# Patient Record
Sex: FEMALE | Race: ASIAN | NOT HISPANIC OR LATINO
[De-identification: names, ages, dates, MRNs, and addresses within clinical notes are randomized per-mention and may not be internally consistent; named-entity substitution may affect disease eponyms.]

---

## 2022-10-12 PROBLEM — Z00.00 ENCOUNTER FOR PREVENTIVE HEALTH EXAMINATION: Status: ACTIVE | Noted: 2022-10-12

## 2022-10-17 ENCOUNTER — APPOINTMENT (OUTPATIENT)
Dept: RHEUMATOLOGY | Facility: CLINIC | Age: 47
End: 2022-10-17

## 2022-10-17 VITALS
BODY MASS INDEX: 31.72 KG/M2 | OXYGEN SATURATION: 98 % | HEART RATE: 78 BPM | TEMPERATURE: 98 F | WEIGHT: 168 LBS | HEIGHT: 61 IN | DIASTOLIC BLOOD PRESSURE: 71 MMHG | SYSTOLIC BLOOD PRESSURE: 111 MMHG | RESPIRATION RATE: 16 BRPM

## 2022-10-17 DIAGNOSIS — K21.9 GASTRO-ESOPHAGEAL REFLUX DISEASE W/OUT ESOPHAGITIS: ICD-10-CM

## 2022-10-17 DIAGNOSIS — Z78.9 OTHER SPECIFIED HEALTH STATUS: ICD-10-CM

## 2022-10-17 DIAGNOSIS — Z86.39 PERSONAL HISTORY OF OTHER ENDOCRINE, NUTRITIONAL AND METABOLIC DISEASE: ICD-10-CM

## 2022-10-17 DIAGNOSIS — R20.0 ANESTHESIA OF SKIN: ICD-10-CM

## 2022-10-17 DIAGNOSIS — G89.29 PAIN IN UNSPECIFIED JOINT: ICD-10-CM

## 2022-10-17 DIAGNOSIS — G89.29 OTHER CHRONIC PAIN: ICD-10-CM

## 2022-10-17 DIAGNOSIS — M25.50 PAIN IN UNSPECIFIED JOINT: ICD-10-CM

## 2022-10-17 PROCEDURE — 99203 OFFICE O/P NEW LOW 30 MIN: CPT

## 2022-10-17 RX ORDER — VALACYCLOVIR 1 G/1
1 TABLET, FILM COATED ORAL
Qty: 20 | Refills: 0 | Status: DISCONTINUED | COMMUNITY
Start: 2022-07-29 | End: 2022-10-17

## 2022-10-17 RX ORDER — BENZONATATE 100 MG/1
100 CAPSULE ORAL
Qty: 21 | Refills: 0 | Status: DISCONTINUED | COMMUNITY
Start: 2022-07-12 | End: 2022-10-17

## 2022-10-17 RX ORDER — OMEPRAZOLE 20 MG/1
20 CAPSULE, DELAYED RELEASE ORAL
Refills: 0 | Status: ACTIVE | COMMUNITY
Start: 2022-10-17

## 2022-10-17 RX ORDER — PROMETHAZINE HYDROCHLORIDE AND DEXTROMETHORPHAN HYDROBROMIDE ORAL SOLUTION 15; 6.25 MG/5ML; MG/5ML
6.25-15 SOLUTION ORAL
Qty: 300 | Refills: 0 | Status: DISCONTINUED | COMMUNITY
Start: 2022-07-29 | End: 2022-10-17

## 2022-10-17 RX ORDER — FOLIC ACID 1 MG/1
1 TABLET ORAL
Qty: 30 | Refills: 0 | Status: DISCONTINUED | COMMUNITY
Start: 2022-07-23 | End: 2022-10-17

## 2022-10-17 RX ORDER — PREDNISONE 20 MG/1
20 TABLET ORAL
Qty: 5 | Refills: 0 | Status: DISCONTINUED | COMMUNITY
Start: 2022-07-29 | End: 2022-10-17

## 2022-10-17 RX ORDER — DICLOFENAC SODIUM 1% 10 MG/G
1 GEL TOPICAL
Qty: 100 | Refills: 0 | Status: ACTIVE | COMMUNITY
Start: 2022-09-06

## 2022-10-17 RX ORDER — ADALIMUMAB 40MG/0.4ML
40 KIT SUBCUTANEOUS
Qty: 2 | Refills: 0 | Status: DISCONTINUED | COMMUNITY
Start: 2022-06-07 | End: 2022-10-17

## 2022-10-17 RX ORDER — ERGOCALCIFEROL 1.25 MG/1
1.25 MG CAPSULE, LIQUID FILLED ORAL
Qty: 5 | Refills: 0 | Status: ACTIVE | COMMUNITY
Start: 2022-05-18

## 2022-10-17 RX ORDER — PNV NO.95/FERROUS FUM/FOLIC AC 28MG-0.8MG
TABLET ORAL
Refills: 0 | Status: ACTIVE | COMMUNITY

## 2022-10-17 RX ORDER — TRIAMCINOLONE ACETONIDE 1 MG/G
0.1 CREAM TOPICAL
Qty: 80 | Refills: 0 | Status: ACTIVE | COMMUNITY
Start: 2022-09-06

## 2022-10-17 RX ORDER — OMEPRAZOLE 40 MG/1
40 CAPSULE, DELAYED RELEASE ORAL
Qty: 30 | Refills: 0 | Status: DISCONTINUED | COMMUNITY
Start: 2022-08-29 | End: 2022-10-17

## 2022-10-17 RX ORDER — CETIRIZINE HYDROCHLORIDE 10 MG/1
10 TABLET, COATED ORAL
Qty: 180 | Refills: 0 | Status: ACTIVE | COMMUNITY
Start: 2022-09-06

## 2022-10-17 RX ORDER — METHOTREXATE 2.5 MG/1
2.5 TABLET ORAL
Qty: 20 | Refills: 0 | Status: DISCONTINUED | COMMUNITY
Start: 2022-07-23 | End: 2022-10-17

## 2022-10-17 RX ORDER — AZITHROMYCIN 250 MG/1
250 TABLET, FILM COATED ORAL
Qty: 6 | Refills: 0 | Status: DISCONTINUED | COMMUNITY
Start: 2022-07-12 | End: 2022-10-17

## 2022-10-17 RX ORDER — HYDROXYZINE HYDROCHLORIDE 25 MG/1
25 TABLET ORAL
Qty: 100 | Refills: 0 | Status: DISCONTINUED | COMMUNITY
Start: 2022-09-06 | End: 2022-10-17

## 2022-10-27 ENCOUNTER — OUTPATIENT (OUTPATIENT)
Dept: OUTPATIENT SERVICES | Facility: HOSPITAL | Age: 47
LOS: 1 days | End: 2022-10-27
Payer: MEDICAID

## 2022-10-27 ENCOUNTER — APPOINTMENT (OUTPATIENT)
Dept: MRI IMAGING | Facility: IMAGING CENTER | Age: 47
End: 2022-10-27

## 2022-10-27 DIAGNOSIS — M25.50 PAIN IN UNSPECIFIED JOINT: ICD-10-CM

## 2022-10-27 LAB
ALBUMIN SERPL ELPH-MCNC: 4.5 G/DL
ALP BLD-CCNC: 117 U/L
ALT SERPL-CCNC: 21 U/L
ANION GAP SERPL CALC-SCNC: 11 MMOL/L
AST SERPL-CCNC: 20 U/L
BASOPHILS # BLD AUTO: 0.03 K/UL
BASOPHILS NFR BLD AUTO: 0.5 %
BILIRUB SERPL-MCNC: 0.6 MG/DL
BUN SERPL-MCNC: 11 MG/DL
CALCIUM SERPL-MCNC: 10 MG/DL
CALCIUM SERPL-MCNC: 10 MG/DL
CHLORIDE SERPL-SCNC: 103 MMOL/L
CO2 SERPL-SCNC: 26 MMOL/L
CREAT SERPL-MCNC: 0.81 MG/DL
CRP SERPL-MCNC: 7 MG/L
EGFR: 90 ML/MIN/1.73M2
EOSINOPHIL # BLD AUTO: 0.18 K/UL
EOSINOPHIL NFR BLD AUTO: 3.3 %
ERYTHROCYTE [SEDIMENTATION RATE] IN BLOOD BY WESTERGREN METHOD: 44 MM/HR
GLUCOSE SERPL-MCNC: 85 MG/DL
HCT VFR BLD CALC: 37.3 %
HGB BLD-MCNC: 11.6 G/DL
IMM GRANULOCYTES NFR BLD AUTO: 0.4 %
LYMPHOCYTES # BLD AUTO: 1.65 K/UL
LYMPHOCYTES NFR BLD AUTO: 29.9 %
MAN DIFF?: NORMAL
MCHC RBC-ENTMCNC: 27.3 PG
MCHC RBC-ENTMCNC: 31.1 GM/DL
MCV RBC AUTO: 87.8 FL
MONOCYTES # BLD AUTO: 0.56 K/UL
MONOCYTES NFR BLD AUTO: 10.1 %
NEUTROPHILS # BLD AUTO: 3.08 K/UL
NEUTROPHILS NFR BLD AUTO: 55.8 %
PARATHYROID HORMONE INTACT: 37 PG/ML
PLATELET # BLD AUTO: 258 K/UL
POTASSIUM SERPL-SCNC: 4.2 MMOL/L
PROT SERPL-MCNC: 7.5 G/DL
RBC # BLD: 4.25 M/UL
RBC # FLD: 15.9 %
SODIUM SERPL-SCNC: 140 MMOL/L
THYROGLOB AB SERPL-ACNC: <20 IU/ML
THYROPEROXIDASE AB SERPL IA-ACNC: 12.2 IU/ML
TSH SERPL-ACNC: 3.99 UIU/ML
TSI ACT/NOR SER: <0.1 IU/L
WBC # FLD AUTO: 5.52 K/UL

## 2022-10-27 PROCEDURE — 73721 MRI JNT OF LWR EXTRE W/O DYE: CPT | Mod: 26,LT

## 2022-10-27 PROCEDURE — 73721 MRI JNT OF LWR EXTRE W/O DYE: CPT

## 2022-10-31 LAB
14-3-3 ETA AG SER IA-MCNC: <0.2 NG/ML
CCP ANTIBODIES IGG/IGA: <20 UNITS
RHEUMATOID FACTOR IDENTRA: <14 UNITS/ML

## 2022-11-29 LAB
MISCELLANEOUS TEST: NORMAL
PROC NAME: NORMAL

## 2023-01-09 ENCOUNTER — APPOINTMENT (OUTPATIENT)
Dept: RHEUMATOLOGY | Facility: CLINIC | Age: 48
End: 2023-01-09
Payer: MEDICAID

## 2023-01-09 VITALS
OXYGEN SATURATION: 98 % | HEIGHT: 60 IN | WEIGHT: 170 LBS | SYSTOLIC BLOOD PRESSURE: 107 MMHG | BODY MASS INDEX: 33.38 KG/M2 | HEART RATE: 73 BPM | RESPIRATION RATE: 16 BRPM | DIASTOLIC BLOOD PRESSURE: 70 MMHG | TEMPERATURE: 98 F

## 2023-01-09 DIAGNOSIS — M17.12 UNILATERAL PRIMARY OSTEOARTHRITIS, LEFT KNEE: ICD-10-CM

## 2023-01-09 DIAGNOSIS — M17.11 UNILATERAL PRIMARY OSTEOARTHRITIS, RIGHT KNEE: ICD-10-CM

## 2023-01-09 PROCEDURE — 99214 OFFICE O/P EST MOD 30 MIN: CPT | Mod: 25

## 2023-01-09 NOTE — PROCEDURE
[Today's Date:] : Date: [unfilled] [Risks] : risks [Benefits] : benefits [Alternatives] : alternatives [Consent Obtained] : written consent was obtained prior to the procedure and is detailed in the patient's record [Patient] : Prior to the start of the procedure a time out was taken and the identity of the patient was confirmed via name and date of birth with the patient. The correct site and the procedure to be performed were confirmed. The correct side was confirmed if applicable. The availability of the correct equipment was verified [Therapeutic] : therapeutic [#1 Site: ______] : #1 site identified in the [unfilled] [Betadine] : betadine solution [22 gauge 1 inch] : A 22 gauge 1 inch needle was used [___ml 1% Lidocaine] : [unfilled] ml of 1% lidocaine [Depomedrol ___ mg] : Depomedrol [unfilled] mg [Tolerated Well] : the patient tolerated the procedure well [Reports Improvement in Symptoms] : reports improvement in symptoms [No Complications] : there were no complications [Instructions Given] : handouts/patient instructions were given to patient [Patient Instructed to Call] : patient was instructed to call if redness at site, a decrease in range of motion or an increase in pain is noted after procedure.

## 2023-01-09 NOTE — HISTORY OF PRESENT ILLNESS
[___ Month(s) Ago] : [unfilled] month(s) ago [Currently Experiencing] : currently [Arthralgias] : arthralgias [Joint Swelling] : joint swelling [Joint Deformity] : joint deformity [Decreased ROM] : decreased range of motion [Difficulty Standing] : difficulty standing [Difficulty Walking] : difficulty walking [FreeTextEntry1] : Had really bad knee pains for which she was taking Tramadol and Naproxen. C/o pain all the time R > L\par Thinks that her R elbow is also starting to act up and it is getting stuck.  [Anorexia] : no anorexia [Weight Loss] : no weight loss [Malaise] : no malaise [Fever] : no fever [Chills] : no chills [Fatigue] : no fatigue [Depression] : no depression [Malar Facial Rash] : no malar facial rash [Skin Lesions] : no lesions [Skin Nodules] : no skin nodules [Oral Ulcers] : no oral ulcers [Cough] : no cough [Dry Mouth] : no dry mouth [Dysphonia] : no dysphonia [Dysphagia] : no dysphagia [Shortness of Breath] : no shortness of breath [Chest Pain] : no chest pain [Joint Warmth] : no joint warmth [Morning Stiffness] : no morning stiffness [Falls] : no falls [Dyspnea] : no dyspnea [Myalgias] : no myalgias [Muscle Weakness] : no muscle weakness [Muscle Spasms] : no muscle spasms [Muscle Cramping] : no muscle cramping [Visual Changes] : no visual changes [Eye Pain] : no eye pain [Eye Redness] : no eye redness [Dry Eyes] : no dry eyes

## 2023-01-09 NOTE — REVIEW OF SYSTEMS
[As Noted in HPI] : as noted in HPI [Arthralgias] : arthralgias [Joint Pain] : joint pain [Joint Swelling] : joint swelling [Joint Stiffness] : joint stiffness [Limb Pain] : limb pain [Negative] : Heme/Lymph [Fever] : no fever [Chills] : no chills [Feeling Poorly] : not feeling poorly [Feeling Tired] : not feeling tired [Recent Weight Gain (___ Lbs)] : no recent weight gain [Recent Weight Loss (___ Lbs)] : no recent weight loss [Limb Swelling] : no limb swelling

## 2023-01-09 NOTE — ASSESSMENT
[FreeTextEntry1] : 47 year old female with joint pains here for evaluation \par \par 1. Joint pains: symptoms are not consistent with RA although she has had this diagnosis for some time. Serologies were negative at last visit and her knee MRI was mostly suggestive of OA. Discussed the differences between OA and RA and symptoms of OA at [facility]. Given severe R knee pain elected for steroid injection at this visit. Please see procedure note for details. Referred for knee PT. Also prescribed Meloxicam 7.5 mg BID PRN and Tizanidine 2 mg q8h PRN for pain. \par \par Follow up in 2 months

## 2023-01-09 NOTE — DATA REVIEWED
[FreeTextEntry1] : MRI L Knee (10/2022): CRUCIATE AND COLLATERAL LIGAMENTS: The ACL, PCL, MCL, and LCL complex are intact. MEDIAL COMPARTMENT: No medial meniscal tear. Superficial fissuring in the articular cartilage of the central weightbearing portion of the femoral condyle and apposing surface of the central tibial plateau. LATERAL COMPARTMENT: No lateral meniscal tear. Articular cartilage is preserved. PATELLOFEMORAL COMPARTMENT: Full-thickness chondral loss in the medial facet of the patella with subchondral marrow edema. Additional full-thickness chondral loss is seen in the lateral facet of the patella with subchondral marrow edema. Superficial fissuring of the cartilage of the central trochlea.\par EXTENSOR MECHANISM: Intact. SYNOVIUM/ JOINT FLUID: Trace knee joint effusion. No synovitis/debris identified. No Baker's cyst. BONE MARROW: No fracture or osteonecrosis. MUSCLES: No focal muscle edema or atrophy. NEUROVASCULAR STRUCTURES: Normal in course and caliber. SUBCUTANEOUS SOFT TISSUES: Edema in the subcutaneous fat anterior to the tibial tubercle. IMPRESSION: 1. No acute ligamentous or meniscal injury. 2. Small knee joint effusion. No synovitis/debris. 3. Mild to moderate chondral loss, most pronounced in the patellofemoral compartment, as described above. \par \par MRI R knee (10/2022): CRUCIATE AND COLLATERAL LIGAMENTS: The ACL, PCL, MCL, and LCL complex are intact.\par \par MEDIAL COMPARTMENT: No medial meniscal tear. 50% chondral loss in the posterior weightbearing portion of the femoral condyle. Tibial cartilage is maintained. LATERAL COMPARTMENT: No lateral meniscal tear. Articular cartilage is preserved. PATELLOFEMORAL COMPARTMENT: Greater than 50% chondral loss with subchondral marrow edema over the median ridge of the patella. EXTENSOR MECHANISM: Intact. \par SYNOVIUM/ JOINT FLUID: Small knee joint effusion. No synovitis/debris. No Baker's cyst. BONE MARROW: No fracture or osteonecrosis. MUSCLES: No focal muscle edema or atrophy. NEUROVASCULAR STRUCTURES: Normal in course and caliber. SUBCUTANEOUS SOFT TISSUES: Mild edema in the subcutaneous fat along the anterior aspect of the knee joint. IMPRESSION: 1. No acute ligamentous or meniscal injury. 2. Small knee joint effusion. No synovitis/debris within the knee joint. 3. Mild chondral loss predominantly in the patellofemoral compartment, as described above.\par \par Prior records reviewed and scanned into [employer]

## 2023-01-09 NOTE — PHYSICAL EXAM
[General Appearance - Alert] : alert [General Appearance - In No Acute Distress] : in no acute distress [Sclera] : the sclera and conjunctiva were normal [PERRL With Normal Accommodation] : pupils were equal in size, round, and reactive to light [Extraocular Movements] : extraocular movements were intact [Outer Ear] : the ears and nose were normal in appearance [Oropharynx] : the oropharynx was normal [Neck Appearance] : the appearance of the neck was normal [Neck Cervical Mass (___cm)] : no neck mass was observed [Jugular Venous Distention Increased] : there was no jugular-venous distention [Thyroid Diffuse Enlargement] : the thyroid was not enlarged [Thyroid Nodule] : there were no palpable thyroid nodules [Auscultation Breath Sounds / Voice Sounds] : lungs were clear to auscultation bilaterally [Heart Rate And Rhythm] : heart rate was normal and rhythm regular [Heart Sounds] : normal S1 and S2 [Heart Sounds Gallop] : no gallops [Murmurs] : no murmurs [Heart Sounds Pericardial Friction Rub] : no pericardial rub [Full Pulse] : the pedal pulses are present [Edema] : there was no peripheral edema [Cervical Lymph Nodes Enlarged Posterior Bilaterally] : posterior cervical [Cervical Lymph Nodes Enlarged Anterior Bilaterally] : anterior cervical [Supraclavicular Lymph Nodes Enlarged Bilaterally] : supraclavicular [Axillary Lymph Nodes Enlarged Bilaterally] : axillary [No CVA Tenderness] : no ~M costovertebral angle tenderness [No Spinal Tenderness] : no spinal tenderness [Abnormal Walk] : normal gait [Nail Clubbing] : no clubbing  or cyanosis of the fingernails [Musculoskeletal - Swelling] : no joint swelling seen [Motor Tone] : muscle strength and tone were normal [Skin Color & Pigmentation] : normal skin color and pigmentation [Skin Turgor] : normal skin turgor [] : no rash [No Focal Deficits] : no focal deficits [Oriented To Time, Place, And Person] : oriented to person, place, and time [Impaired Insight] : insight and judgment were intact [Affect] : the affect was normal [FreeTextEntry1] : TTP along the R medial joint line in the knee

## 2023-04-03 ENCOUNTER — APPOINTMENT (OUTPATIENT)
Dept: RHEUMATOLOGY | Facility: CLINIC | Age: 48
End: 2023-04-03
Payer: MEDICAID

## 2023-04-03 VITALS
HEIGHT: 60 IN | BODY MASS INDEX: 32.79 KG/M2 | TEMPERATURE: 96.3 F | OXYGEN SATURATION: 98 % | RESPIRATION RATE: 16 BRPM | DIASTOLIC BLOOD PRESSURE: 83 MMHG | SYSTOLIC BLOOD PRESSURE: 128 MMHG | WEIGHT: 167 LBS | HEART RATE: 93 BPM

## 2023-04-03 DIAGNOSIS — M76.61 ACHILLES TENDINITIS, RIGHT LEG: ICD-10-CM

## 2023-04-03 DIAGNOSIS — M72.2 PLANTAR FASCIAL FIBROMATOSIS: ICD-10-CM

## 2023-04-03 PROCEDURE — 99214 OFFICE O/P EST MOD 30 MIN: CPT

## 2023-04-03 RX ORDER — TRAMADOL HYDROCHLORIDE 50 MG/1
50 TABLET, COATED ORAL
Qty: 56 | Refills: 0 | Status: DISCONTINUED | COMMUNITY
Start: 2022-09-06 | End: 2023-04-03

## 2023-04-03 NOTE — ASSESSMENT
[FreeTextEntry1] : 48 year old female with joint pains here for evaluation \par \par 1. Joint pains: symptoms are not consistent with RA although she has had this diagnosis for some time. Serologies were negative at last visit and her knee MRI was mostly suggestive of OA. Referred for knee PT. Also prescribed Meloxicam 7.5 mg BID PRN and Tizanidine 2 mg q8h PRN for pain. \par \par 2. R heel pain: US ordered. Exercises for plantar fasciitis given to patient to do at home. \par \par Follow up in 2 months

## 2023-04-03 NOTE — REVIEW OF SYSTEMS
[Arthralgias] : arthralgias [As Noted in HPI] : as noted in HPI [Joint Pain] : joint pain [Joint Swelling] : joint swelling [Joint Stiffness] : joint stiffness [Limb Pain] : limb pain [Negative] : Heme/Lymph [Fever] : no fever [Chills] : no chills [Feeling Poorly] : not feeling poorly [Feeling Tired] : not feeling tired [Recent Weight Gain (___ Lbs)] : no recent weight gain [Recent Weight Loss (___ Lbs)] : no recent weight loss [Limb Swelling] : no limb swelling

## 2023-04-03 NOTE — PHYSICAL EXAM
[General Appearance - Alert] : alert [General Appearance - In No Acute Distress] : in no acute distress [Sclera] : the sclera and conjunctiva were normal [PERRL With Normal Accommodation] : pupils were equal in size, round, and reactive to light [Extraocular Movements] : extraocular movements were intact [Oropharynx] : the oropharynx was normal [Outer Ear] : the ears and nose were normal in appearance [Neck Appearance] : the appearance of the neck was normal [Neck Cervical Mass (___cm)] : no neck mass was observed [Jugular Venous Distention Increased] : there was no jugular-venous distention [Thyroid Diffuse Enlargement] : the thyroid was not enlarged [Thyroid Nodule] : there were no palpable thyroid nodules [Auscultation Breath Sounds / Voice Sounds] : lungs were clear to auscultation bilaterally [Heart Rate And Rhythm] : heart rate was normal and rhythm regular [Heart Sounds] : normal S1 and S2 [Heart Sounds Gallop] : no gallops [Murmurs] : no murmurs [Heart Sounds Pericardial Friction Rub] : no pericardial rub [Full Pulse] : the pedal pulses are present [Edema] : there was no peripheral edema [Cervical Lymph Nodes Enlarged Posterior Bilaterally] : posterior cervical [Cervical Lymph Nodes Enlarged Anterior Bilaterally] : anterior cervical [Supraclavicular Lymph Nodes Enlarged Bilaterally] : supraclavicular [Axillary Lymph Nodes Enlarged Bilaterally] : axillary [No CVA Tenderness] : no ~M costovertebral angle tenderness [No Spinal Tenderness] : no spinal tenderness [Abnormal Walk] : normal gait [Nail Clubbing] : no clubbing  or cyanosis of the fingernails [Musculoskeletal - Swelling] : no joint swelling seen [Motor Tone] : muscle strength and tone were normal [Skin Color & Pigmentation] : normal skin color and pigmentation [Skin Turgor] : normal skin turgor [] : no rash [No Focal Deficits] : no focal deficits [Impaired Insight] : insight and judgment were intact [Oriented To Time, Place, And Person] : oriented to person, place, and time [Affect] : the affect was normal [FreeTextEntry1] : TTP along the R medial joint line in the knee. TTP of the R heel and at the insertion of the Achilles tendon

## 2023-04-03 NOTE — HISTORY OF PRESENT ILLNESS
[___ Month(s) Ago] : [unfilled] month(s) ago [Currently Experiencing] : currently [Arthralgias] : arthralgias [Joint Swelling] : joint swelling [Joint Deformity] : joint deformity [Decreased ROM] : decreased range of motion [Difficulty Standing] : difficulty standing [Difficulty Walking] : difficulty walking [FreeTextEntry1] : The R knee pain improved slightly but she still feels pain in the knee and now thinks its radiating into the R foot on the plantar aspect and the L knee. Takes Meloxicam once or twice a week - states she gets appr. 80% relief with that. Does not like [facility] and wants to switch the rehab facility.  [Anorexia] : no anorexia [Weight Loss] : no weight loss [Malaise] : no malaise [Fever] : no fever [Chills] : no chills [Fatigue] : no fatigue [Depression] : no depression [Malar Facial Rash] : no malar facial rash [Skin Lesions] : no lesions [Skin Nodules] : no skin nodules [Oral Ulcers] : no oral ulcers [Cough] : no cough [Dry Mouth] : no dry mouth [Dysphonia] : no dysphonia [Dysphagia] : no dysphagia [Shortness of Breath] : no shortness of breath [Chest Pain] : no chest pain [Joint Warmth] : no joint warmth [Morning Stiffness] : no morning stiffness [Falls] : no falls [Dyspnea] : no dyspnea [Myalgias] : no myalgias [Muscle Weakness] : no muscle weakness [Muscle Spasms] : no muscle spasms [Muscle Cramping] : no muscle cramping [Visual Changes] : no visual changes [Eye Pain] : no eye pain [Eye Redness] : no eye redness [Dry Eyes] : no dry eyes

## 2023-10-02 ENCOUNTER — APPOINTMENT (OUTPATIENT)
Dept: RHEUMATOLOGY | Facility: CLINIC | Age: 48
End: 2023-10-02
Payer: MEDICAID

## 2023-10-02 VITALS
TEMPERATURE: 97.1 F | HEIGHT: 60 IN | OXYGEN SATURATION: 97 % | DIASTOLIC BLOOD PRESSURE: 75 MMHG | RESPIRATION RATE: 16 BRPM | WEIGHT: 170 LBS | HEART RATE: 83 BPM | SYSTOLIC BLOOD PRESSURE: 118 MMHG | BODY MASS INDEX: 33.38 KG/M2

## 2023-10-02 PROCEDURE — 99214 OFFICE O/P EST MOD 30 MIN: CPT

## 2023-10-02 RX ORDER — SULFASALAZINE 500 MG/1
500 TABLET ORAL
Qty: 120 | Refills: 0 | Status: DISCONTINUED | COMMUNITY
Start: 2022-07-23 | End: 2023-10-02

## 2023-10-02 RX ORDER — MELOXICAM 7.5 MG/1
7.5 TABLET ORAL TWICE DAILY
Qty: 60 | Refills: 1 | Status: ACTIVE | COMMUNITY
Start: 2023-01-09 | End: 1900-01-01

## 2023-10-02 RX ORDER — TIZANIDINE 2 MG/1
2 TABLET ORAL
Qty: 30 | Refills: 1 | Status: ACTIVE | COMMUNITY
Start: 2023-01-09 | End: 1900-01-01

## 2024-05-20 ENCOUNTER — APPOINTMENT (OUTPATIENT)
Dept: RHEUMATOLOGY | Facility: CLINIC | Age: 49
End: 2024-05-20

## 2024-05-20 NOTE — REVIEW OF SYSTEMS
[Fever] : no fever [Chills] : no chills [Feeling Poorly] : not feeling poorly [Feeling Tired] : not feeling tired [Recent Weight Gain (___ Lbs)] : no recent weight gain [Recent Weight Loss (___ Lbs)] : no recent weight loss [As Noted in HPI] : as noted in HPI [Arthralgias] : arthralgias [Joint Pain] : joint pain [Joint Swelling] : joint swelling [Joint Stiffness] : joint stiffness [Limb Pain] : limb pain [Limb Swelling] : no limb swelling [Negative] : Heme/Lymph

## 2024-05-20 NOTE — HISTORY OF PRESENT ILLNESS
[FreeTextEntry1] : 48 year old female with h/o RA and chronic pain here to establish care   She went to her PCP to get evaluated for her knee pain. She was referred to Dr. Karen Shepherd who diagnosed with RA based on physical exam - started MTX and Humira injections and also started on Sulfasalazine. Also taking Tramadol and Motrin to control her symptoms. She stopped her injection (MTX and Humira) 3 months ago because they made her feel sick. MTX - took for 2 months and Humira - took 3 injections. Does think that it was helping her pain. Started sulfasalazine 3 months ago and stopped 15 days ago.   Her primary complaint is of bilateral knee swelling and pain. This is all the time, waxing and waning. Tramadol and Naproxen helps with the swelling. R foot plantar pain. Has trouble kneeling and bending because of the knee pain. No pain in the hips and ankles. Sometimes notices swelling around the lateral malleolus of the ankles bilaterally. She has occasional pain in  the hands - describes this mostly as a pulling sensation in the nerves and shooting pain from her thumb to the elbow, particularly on the R side. Sometimes her R hand gets numb. Denies any swelling, pain or stiffness in the MCPs, PIPs wrists, shoulder or elbows.   Denies any constitutional symptoms - fevers, chills, malaise, weight loss, myalgias. No h/o rashes, uveitis, hair loss, headaches, mucosal ulcers, Raynaud's, sicca symptoms, GI or  issues, serositis, pleuritis, pericarditis, weakness or paresthesias. No h/o blood clots.  01/2023: Had really bad knee pains for which she was taking Tramadol and Naproxen. C/o pain all the time R > L Thinks that her R elbow is also starting to act up and it is getting stuck.   04/2023: The R knee pain improved slightly but she still feels pain in the knee and now thinks its radiating into the R foot on the plantar aspect and the L knee. Takes Meloxicam once or twice a week - states she gets appr. 80% relief with that. Does not like [facility] and wants to switch the rehab facility.   10/2023: C/o body pain. Wants to go back to PT.   [___ Month(s) Ago] : [unfilled] month(s) ago [Currently Experiencing] : currently [Anorexia] : no anorexia [Weight Loss] : no weight loss [Malaise] : no malaise [Fever] : no fever [Chills] : no chills [Fatigue] : no fatigue [Depression] : no depression [Malar Facial Rash] : no malar facial rash [Skin Lesions] : no lesions [Skin Nodules] : no skin nodules [Oral Ulcers] : no oral ulcers [Cough] : no cough [Dry Mouth] : no dry mouth [Dysphonia] : no dysphonia [Dysphagia] : no dysphagia [Shortness of Breath] : no shortness of breath [Chest Pain] : no chest pain [Arthralgias] : arthralgias [Joint Swelling] : joint swelling [Joint Warmth] : no joint warmth [Joint Deformity] : joint deformity [Decreased ROM] : decreased range of motion [Morning Stiffness] : no morning stiffness [Falls] : no falls [Difficulty Standing] : difficulty standing [Difficulty Walking] : difficulty walking [Dyspnea] : no dyspnea [Myalgias] : no myalgias [Muscle Weakness] : no muscle weakness [Muscle Spasms] : no muscle spasms [Muscle Cramping] : no muscle cramping [Visual Changes] : no visual changes [Eye Pain] : no eye pain [Eye Redness] : no eye redness [Dry Eyes] : no dry eyes

## 2024-05-20 NOTE — DATA REVIEWED
[FreeTextEntry1] : MRI L Knee (10/2022): CRUCIATE AND COLLATERAL LIGAMENTS: The ACL, PCL, MCL, and LCL complex are intact. MEDIAL COMPARTMENT: No medial meniscal tear. Superficial fissuring in the articular cartilage of the central weightbearing portion of the femoral condyle and apposing surface of the central tibial plateau. LATERAL COMPARTMENT: No lateral meniscal tear. Articular cartilage is preserved. PATELLOFEMORAL COMPARTMENT: Full-thickness chondral loss in the medial facet of the patella with subchondral marrow edema. Additional full-thickness chondral loss is seen in the lateral facet of the patella with subchondral marrow edema. Superficial fissuring of the cartilage of the central trochlea. EXTENSOR MECHANISM: Intact. SYNOVIUM/ JOINT FLUID: Trace knee joint effusion. No synovitis/debris identified. No Baker's cyst. BONE MARROW: No fracture or osteonecrosis. MUSCLES: No focal muscle edema or atrophy. NEUROVASCULAR STRUCTURES: Normal in course and caliber. SUBCUTANEOUS SOFT TISSUES: Edema in the subcutaneous fat anterior to the tibial tubercle. IMPRESSION: 1. No acute ligamentous or meniscal injury. 2. Small knee joint effusion. No synovitis/debris. 3. Mild to moderate chondral loss, most pronounced in the patellofemoral compartment, as described above.   MRI R knee (10/2022): CRUCIATE AND COLLATERAL LIGAMENTS: The ACL, PCL, MCL, and LCL complex are intact.  MEDIAL COMPARTMENT: No medial meniscal tear. 50% chondral loss in the posterior weightbearing portion of the femoral condyle. Tibial cartilage is maintained. LATERAL COMPARTMENT: No lateral meniscal tear. Articular cartilage is preserved. PATELLOFEMORAL COMPARTMENT: Greater than 50% chondral loss with subchondral marrow edema over the median ridge of the patella. EXTENSOR MECHANISM: Intact.  SYNOVIUM/ JOINT FLUID: Small knee joint effusion. No synovitis/debris. No Baker's cyst. BONE MARROW: No fracture or osteonecrosis. MUSCLES: No focal muscle edema or atrophy. NEUROVASCULAR STRUCTURES: Normal in course and caliber. SUBCUTANEOUS SOFT TISSUES: Mild edema in the subcutaneous fat along the anterior aspect of the knee joint. IMPRESSION: 1. No acute ligamentous or meniscal injury. 2. Small knee joint effusion. No synovitis/debris within the knee joint. 3. Mild chondral loss predominantly in the patellofemoral compartment, as described above.  Prior records reviewed and scanned into [employer]

## 2024-05-20 NOTE — PHYSICAL EXAM
[General Appearance - Alert] : alert [General Appearance - In No Acute Distress] : in no acute distress [Sclera] : the sclera and conjunctiva were normal [PERRL With Normal Accommodation] : pupils were equal in size, round, and reactive to light [Extraocular Movements] : extraocular movements were intact [Outer Ear] : the ears and nose were normal in appearance [Oropharynx] : the oropharynx was normal [Neck Appearance] : the appearance of the neck was normal [Jugular Venous Distention Increased] : there was no jugular-venous distention [Neck Cervical Mass (___cm)] : no neck mass was observed [Thyroid Diffuse Enlargement] : the thyroid was not enlarged [Thyroid Nodule] : there were no palpable thyroid nodules [Auscultation Breath Sounds / Voice Sounds] : lungs were clear to auscultation bilaterally [Heart Rate And Rhythm] : heart rate was normal and rhythm regular [Heart Sounds] : normal S1 and S2 [Heart Sounds Gallop] : no gallops [Murmurs] : no murmurs [Heart Sounds Pericardial Friction Rub] : no pericardial rub [Full Pulse] : the pedal pulses are present [Edema] : there was no peripheral edema [Cervical Lymph Nodes Enlarged Posterior Bilaterally] : posterior cervical [Cervical Lymph Nodes Enlarged Anterior Bilaterally] : anterior cervical [Supraclavicular Lymph Nodes Enlarged Bilaterally] : supraclavicular [Axillary Lymph Nodes Enlarged Bilaterally] : axillary [No CVA Tenderness] : no ~M costovertebral angle tenderness [No Spinal Tenderness] : no spinal tenderness [Skin Color & Pigmentation] : normal skin color and pigmentation [] : no rash [Skin Turgor] : normal skin turgor [No Focal Deficits] : no focal deficits [Oriented To Time, Place, And Person] : oriented to person, place, and time [Impaired Insight] : insight and judgment were intact [Affect] : the affect was normal [Abnormal Walk] : normal gait [Nail Clubbing] : no clubbing  or cyanosis of the fingernails [Musculoskeletal - Swelling] : no joint swelling seen [Motor Tone] : muscle strength and tone were normal [FreeTextEntry1] : TTP along the R medial joint line in the knee. TTP of the R heel and at the insertion of the Achilles tendon

## 2024-05-20 NOTE — ASSESSMENT
[FreeTextEntry1] : 49-year-old female with joint pains here for evaluation   1. Joint pains: related to OA and body habitus. Serologies were negative at last visit and her knee MRI was mostly suggestive of OA. Referred for knee PT. Also prescribed Meloxicam 7.5 mg BID PRN and Tizanidine 2 mg q8h PRN for pain.   2. R heel pain: Exercises for plantar fasciitis given to patient to do at home.   Follow up in 2 months